# Patient Record
Sex: FEMALE | Race: WHITE | Employment: FULL TIME | URBAN - METROPOLITAN AREA
[De-identification: names, ages, dates, MRNs, and addresses within clinical notes are randomized per-mention and may not be internally consistent; named-entity substitution may affect disease eponyms.]

---

## 2020-06-23 ENCOUNTER — VIRTUAL VISIT (OUTPATIENT)
Dept: FAMILY MEDICINE CLINIC | Age: 25
End: 2020-06-23

## 2020-06-23 DIAGNOSIS — R14.0 ABDOMINAL BLOATING: ICD-10-CM

## 2020-06-23 DIAGNOSIS — M79.89 LEFT AXILLARY SWELLING: Primary | ICD-10-CM

## 2020-06-23 RX ORDER — LIOTHYRONINE SODIUM 5 UG/1
TABLET ORAL
COMMUNITY
Start: 2020-04-10

## 2020-06-23 RX ORDER — SPIRONOLACTONE 50 MG/1
TABLET, FILM COATED ORAL
COMMUNITY
Start: 2020-05-23

## 2020-06-23 RX ORDER — LEVOTHYROXINE SODIUM 125 UG/1
TABLET ORAL
COMMUNITY
Start: 2020-04-10

## 2020-06-23 NOTE — PROGRESS NOTES
[facility] Note  HPI:   Cinthia Hodgkins is a 31 y.o. female who presents to establish care. Chief Complaint   Patient presents with   [facility].     I was in the office while conducting this encounter. Consent:  She and/or her healthcare decision maker is aware that this patient-initiated Telehealth encounter is a billable service, with coverage as determined by her insurance carrier. She is aware that she may receive a bill and has provided verbal consent to proceed: Yes    This virtual visit was conducted via [employer]. Pursuant to the emergency declaration under the [address]5 waiver authority and the [employer] Act, this Virtual  Visit was conducted to reduce the patient's risk of exposure to COVID-19 and provide continuity of care for an established patient. Services were provided through a video synchronous discussion virtually to substitute for in-person clinic visit. Due to this being a TeleHealth evaluation, many elements of the physical examination are unable to be assessed. Total Time: minutes: 21-30 minutes. There is left armpit swelling. Generalized swelling of the lower armpit. Constant. Mildly tender to palpitation. Onset was 8 months ago, no changes since. No redness, warmth, drainage. No breast lumps, bumps, nipple or skin changes. Feel mildly nauseated after eating, always bloated, occasional very mild epigastric pain. Onset was 6 months ago. Occurring a few times per week. Mostly worse after eating. Long standing history of occasional constipation and episodes of diarrhea but recently she has been regular. No lack of appetite, weight loss. Melena, hematochezia. Aunt with chron's. GYN: Dr. Thiago Peters. Last Pap was 2018, normal.      No Known Allergies   There is no problem list on file for this patient.     Past Medical History:   Diagnosis Date    Acne     Hypothyroid       Past Surgical History:   Procedure Laterality Date    HX ORTHOPAEDIC      right wrist      No LMP recorded. Family History   Problem Relation Age of Onset    Heart Attack Mother     No Known Problems Sister     No Known Problems Sister         adopted      Social History     Socioeconomic History    Marital status: SINGLE     Spouse name: Not on file    Number of children: Not on file    Years of education: Not on file    Highest education level: Not on file   Occupational History    Not on file   Social Needs    Financial resource strain: Not on file    Food insecurity     Worry: Not on file     Inability: Not on file    Transportation needs     Medical: Not on file     Non-medical: Not on file   Tobacco Use    Smoking status: Never Smoker    Smokeless tobacco: Never Used   Substance and Sexual Activity    Alcohol use: Yes     Frequency: 2-3 times a week     Drinks per session: 1 or 2     Binge frequency: Never    Drug use: Never    Sexual activity: Yes     Partners: Male   Lifestyle    Physical activity     Days per week: Not on file     Minutes per session: Not on file    Stress: Not on file   Relationships    Social connections     Talks on phone: Not on file     Gets together: Not on file     Attends Che[employer] service: Not on file     Active member of club or organization: Not on file     Attends meetings of clubs or organizations: Not on file     Relationship status: Not on file    Intimate partner violence     Fear of current or ex partner: Not on file     Emotionally abused: Not on file     Physically abused: Not on file     Forced sexual activity: Not on file   Other Topics Concern    Not on file   Social History Narrative    Lives at home with boyfriend. Working for health department. Exercise: Occasionally, ADL's. Diet: Generally healthy.          ROS:   Review of Systems   Constitutional: Negative for chills, diaphoresis, fever, malaise/fatigue and weight loss. HENT: Negative for congestion, sinus pain and sore throat. Eyes: Negative for blurred vision and double vision. Respiratory: Negative for cough and shortness of breath. Cardiovascular: Negative for chest pain, palpitations and leg swelling. Gastrointestinal: Positive for nausea. Negative for blood in stool, constipation, diarrhea, heartburn, melena and vomiting. See HPI   Genitourinary: Negative for dysuria, frequency, hematuria and urgency. Musculoskeletal: Negative for joint pain and myalgias. Skin: Negative for itching and rash. Neurological: Negative for dizziness, tingling, weakness and headaches. Endo/Heme/Allergies: Negative for polydipsia. Psychiatric/Behavioral: Negative. Physical Exam:   There were no vitals taken for this visit. Vitals and Nurse Documentation reviewed. Physical Exam  Constitutional:       General: She is not in acute distress. Appearance: Normal appearance. She is well-developed, well-groomed and overweight. She is not ill-appearing, toxic-appearing or diaphoretic. HENT:      Head: Normocephalic and atraumatic. Right Ear: Hearing normal.      Left Ear: Hearing normal.      Nose: No nasal deformity. Mouth/Throat:      Lips: Pink. No lesions. Mouth: Mucous membranes are moist.   Eyes:      General: Lids are normal.      Conjunctiva/sclera: Conjunctivae normal.   Pulmonary:      Effort: Pulmonary effort is normal.   Chest:       Lymphadenopathy:      Comments: She denies cervical lymphadenopathy felt. Neurological:      Mental Status: She is alert and oriented to person, place, and time. Gait: Gait is intact. Psychiatric:         Attention and Perception: Attention normal.         Mood and Affect: Mood normal.         Speech: Speech normal.         Behavior: Behavior normal. Behavior is cooperative. Thought Content:  Thought content normal.         Cognition and Memory: Cognition normal.         Judgment: Judgment normal.           Assessment/ Plan:   [employer] were discussed including hours of operation, on-call providers, and MyChart. Diagnoses and all orders for this visit:    1. Left axillary swelling: Chronic nodule of the left axilla. Exam is limited virtually but I do appreciate nodule on video exam. Unclear etiology; Differentials include cyst verse lymphadenopathy. Will proceed with ultrasound for further evaluation.   -     US EXT NONVAS LT LTD; Future    2. Abdominal bloating: Chronic abdominal bloating, nausea after eating, mild intermittent epigastric pain. Favor reflux. Advise to take OTC omeprazole daily x 2 weeks. Diet reviewed. 3 week follow-up. Follow-up and Dispositions    · Return in about 3 weeks (around 7/14/2020) for Follow-up. Discussed expected course/resolution/complications of diagnosis in detail with patient.  Medication risks/benefits/costs/interactions/alternatives discussed with patient. Pt expressed understanding with the diagnosis and plan

## 2020-06-30 ENCOUNTER — TELEPHONE (OUTPATIENT)
Dept: FAMILY MEDICINE CLINIC | Age: 25
End: 2020-06-30

## 2020-06-30 DIAGNOSIS — M79.89 LEFT AXILLARY SWELLING: Primary | ICD-10-CM

## 2020-06-30 NOTE — TELEPHONE ENCOUNTER
I am unable to cancel axilla ultrasound because it is already schedule. But I have ordered alternative, left breast ultrasound. Please direct patient to [facility] to schedule visit for left breast ultrasound. I am okay with canceling left axilla ultrasound.  Thank you

## 2020-06-30 NOTE — TELEPHONE ENCOUNTER
Patient has been contacted by [facility] of change of order for ultrasound ( breast / axillary ) scheduled for July 2, 2020.   Mitchel Martínez LPN

## 2020-06-30 NOTE — TELEPHONE ENCOUNTER
Chief Complaint   Patient presents with    Ultrasound     Katie Linn from [facility] contacted our office to get clarification of an Ultrasound order for left lower axilla swelling/nodule. [facility] is stating to cancel appointment due to it being breast related verus armpit. Please review. I informed central scheduling that we will reviwe and will reach out to [facility] for clarification.

## 2020-06-30 NOTE — PROGRESS NOTES
Addendum: 06/30/2010:50 AM: attempted to cancel left axilla ultrasound. Left breast ultrasound placed under telephone encounter.

## 2020-07-02 ENCOUNTER — HOSPITAL ENCOUNTER (OUTPATIENT)
Dept: MAMMOGRAPHY | Age: 25
Discharge: HOME | End: 2020-07-02
Attending: NURSE PRACTITIONER
Payer: COMMERCIAL

## 2020-07-02 DIAGNOSIS — M79.89 LEFT AXILLARY SWELLING: ICD-10-CM

## 2020-07-02 PROCEDURE — 76642 ULTRASOUND BREAST LIMITED: CPT

## 2020-07-02 PROCEDURE — 76882 US LMTD JT/FCL EVL NVASC XTR: CPT

## 2020-07-02 NOTE — PROGRESS NOTES
Mary Alice vazquez. The ultrasound looked reassuring. There were no enlarged lymph nodes or any other suspicious findings noted on the ultrasound. If the swelling reoccurs or dose not resolve, I recommend following up with a dermatologist for evaluation as it may be a small cyst. Please let me know if you have any additional questions.      Nadeem Carroll NP

## 2023-05-21 RX ORDER — LEVOTHYROXINE SODIUM 0.12 MG/1
1 TABLET ORAL DAILY
COMMUNITY
Start: 2020-04-10

## 2023-05-21 RX ORDER — SPIRONOLACTONE 50 MG/1
1 TABLET, FILM COATED ORAL 2 TIMES DAILY
COMMUNITY
Start: 2020-05-23

## 2023-05-21 RX ORDER — LIOTHYRONINE SODIUM 5 UG/1
TABLET ORAL
COMMUNITY
Start: 2020-04-10